# Patient Record
Sex: MALE | Race: WHITE | NOT HISPANIC OR LATINO | Employment: FULL TIME | URBAN - METROPOLITAN AREA
[De-identification: names, ages, dates, MRNs, and addresses within clinical notes are randomized per-mention and may not be internally consistent; named-entity substitution may affect disease eponyms.]

---

## 2022-06-08 ENCOUNTER — OFFICE VISIT (OUTPATIENT)
Dept: PHYSICAL THERAPY | Facility: CLINIC | Age: 37
End: 2022-06-08
Payer: COMMERCIAL

## 2022-06-08 DIAGNOSIS — M25.511 ACUTE PAIN OF RIGHT SHOULDER: Primary | ICD-10-CM

## 2022-06-08 DIAGNOSIS — Z98.890 STATUS POST SHOULDER SURGERY: ICD-10-CM

## 2022-06-08 PROCEDURE — 97112 NEUROMUSCULAR REEDUCATION: CPT | Performed by: PHYSICAL THERAPIST

## 2022-06-08 PROCEDURE — 97161 PT EVAL LOW COMPLEX 20 MIN: CPT | Performed by: PHYSICAL THERAPIST
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
## 2022-06-14 ENCOUNTER — OFFICE VISIT (OUTPATIENT)
Dept: PHYSICAL THERAPY | Facility: CLINIC | Age: 37
End: 2022-06-14
Payer: COMMERCIAL

## 2022-06-14 DIAGNOSIS — Z98.890 STATUS POST SHOULDER SURGERY: ICD-10-CM

## 2022-06-14 DIAGNOSIS — M25.511 ACUTE PAIN OF RIGHT SHOULDER: Primary | ICD-10-CM

## 2022-06-14 PROCEDURE — 97110 THERAPEUTIC EXERCISES: CPT

## 2022-06-14 PROCEDURE — 97140 MANUAL THERAPY 1/> REGIONS: CPT

## 2022-06-14 PROCEDURE — 97112 NEUROMUSCULAR REEDUCATION: CPT

## 2022-06-14 NOTE — PROGRESS NOTES
Daily Note     Today's date: 2022  Patient name: Tigre Kiran  : 1985  MRN: [id number]  Referring provider: Cain Schultz MD  Dx:   Encounter Diagnosis     ICD-10-CM    1  Acute pain of right shoulder  M25 511    2  Status post shoulder surgery  Z98 890                   Subjective: Teresa Burdick reports R shoulder soreness upon arrival, describes sx's as constant and "dull ache"  Pt notes he may be overdoing activity at home, which could be contributing to sorness  Objective: See treatment diary below      Assessment: Zan tolerated PT treatment well  Passive shoulder stretching performed, pt presenting with end range tightness throughout all planes  STM performed along R bicep and deltoid, TP and increased tone present throughout  Initiated periscapular strengthening, pt performed well and w/o symptom provocation  Modified exercises for appropriate diagnoses, discussed with Jenita Opitz, DPT  Time spent educating pt on activity modification at home to reduce R shoulder pain  Pt would benefit from continued PT to further address impairments and maximize functional level  Plan: Continue per plan of care        Precautions: DOS 22      Manuals            R shoulder PROM  JW           STM   JW                                     Neuro Re-Ed             Scap Retract 5"x10            Sh Isometrics 5"x10 ea            TB Row  20/ 3x10           TB Ext  15/ 3x10            TB B ER  O 2x10           TB horz abd   O 2x10            Shoulder add  8/ 3x10            Ther Ex             UBE             UT Stretch 30"x3 :30x3            Lat stretch   :30x3                                                                Ther Activity             Wall Slides                           Gait Training                                       Modalities             CP prn

## 2022-06-22 ENCOUNTER — OFFICE VISIT (OUTPATIENT)
Dept: PHYSICAL THERAPY | Facility: CLINIC | Age: 37
End: 2022-06-22
Payer: COMMERCIAL

## 2022-06-22 DIAGNOSIS — M25.511 ACUTE PAIN OF RIGHT SHOULDER: Primary | ICD-10-CM

## 2022-06-22 DIAGNOSIS — Z98.890 STATUS POST SHOULDER SURGERY: ICD-10-CM

## 2022-06-22 PROCEDURE — 97112 NEUROMUSCULAR REEDUCATION: CPT | Performed by: PHYSICAL THERAPIST

## 2022-06-22 PROCEDURE — 97110 THERAPEUTIC EXERCISES: CPT | Performed by: PHYSICAL THERAPIST

## 2022-06-22 PROCEDURE — 97140 MANUAL THERAPY 1/> REGIONS: CPT | Performed by: PHYSICAL THERAPIST

## 2022-06-22 NOTE — PROGRESS NOTES
Daily Note     Today's date: 2022  Patient name: June Mcfarlane  : 1985  MRN: [id number]  Referring provider: Danie Danielson MD  Dx:   Encounter Diagnosis     ICD-10-CM    1  Acute pain of right shoulder  M25 511    2  Status post shoulder surgery  Z98 890           Subjective: Pt reported that he has been "getting better as it goes "     Objective: See treatment diary below    Assessment: Tolerated treatment well  Patient demonstrated fatigue post treatment, exhibited good technique with therapeutic exercises and would benefit from continued PT  Plan: Continue per plan of care        Precautions: DOS 22      Manuals       R shoulder PROM  JW JZ      STM   JW JZ      AP and inferior mobs    JZ                        Neuro Re-Ed        TB Row  20/ 3x10       TB Ext  15/ 3x10        TB B ER  O 2x10 O 3x10      TB horz abd   O 2x10  O 2x10       Shoulder add  8/ 3x10  MRE 2x2 slow steady reps       U/l LPD kneeling   15/ 2x10                        Ther Ex        UBE         UT Stretch 30"x3 :30x3        Lat stretch   :30x3  :30x2       Horiz add stretch   :30x2      Horiz abd   MRE 2x2 slow steady      Wand scaption AAROM    20x       Wand flexion AAROM   20x       Wand abduction AAROM         Seated thoracic ext 1/2 foam b/l sh flexion   15x slow steady reps                Ther Activity         Wall Slides                                              Modalities         CP prn

## 2022-06-23 ENCOUNTER — OFFICE VISIT (OUTPATIENT)
Dept: PHYSICAL THERAPY | Facility: CLINIC | Age: 37
End: 2022-06-23
Payer: COMMERCIAL

## 2022-06-23 DIAGNOSIS — M25.511 ACUTE PAIN OF RIGHT SHOULDER: Primary | ICD-10-CM

## 2022-06-23 DIAGNOSIS — Z98.890 STATUS POST SHOULDER SURGERY: ICD-10-CM

## 2022-06-23 PROCEDURE — 97110 THERAPEUTIC EXERCISES: CPT

## 2022-06-23 PROCEDURE — 97140 MANUAL THERAPY 1/> REGIONS: CPT

## 2022-06-23 PROCEDURE — 97112 NEUROMUSCULAR REEDUCATION: CPT

## 2022-06-23 NOTE — PROGRESS NOTES
Daily Note     Today's date: 2022  Patient name: Mohamud Daly  : 1985  MRN: [id number]  Referring provider: Soledad Steinberg MD  Dx:   Encounter Diagnosis     ICD-10-CM    1  Acute pain of right shoulder  M25 511    2  Status post shoulder surgery  Z98 890           Subjective: Marlene Ayala reports R shoulder had increased soreness following last PT session  He states soreness is along top of shoulder and into bicep and deltoid musculature  Objective: See treatment diary below    Assessment: Zan tolerated PT treatment well  Initiated session with UBE warm up  Passive shoulder stretching performed, end range tightness present with abduction and rotation range  STM performed along bicep and anterior deltoid, increased tone present throughout musculature  He fatigues fairly quickly with periscapular strengthening  Pt would benefit from continued PT to further address impairments and maximize functional level  Plan: Continue per plan of care        Precautions: DOS 22      Manuals      R shoulder PROM  JW JZ JW     STM   JW JZ JW     AP and inferior mobs    JZ                        Neuro Re-Ed       TB Row  20/ 3x10       TB Ext  15/ 3x10        TB B ER  O 2x10 O 3x10 O 3x10     TB horz abd   O 2x10  O 2x10  O 3x10      Shoulder add  8/ 3x10  MRE 2x2 slow steady reps       U/l LPD kneeling   15/ 2x10                        Ther Ex       UT Stretch 30"x3 :30x3   :30x3 + SCM :30x3      Lat stretch   :30x3  :30x2  :30x3     Horiz add stretch   :30x2 :30x3      Horiz abd   MRE 2x2 slow steady      Wand scaption AAROM    20x  20x      Wand flexion AAROM   20x  20x     Wand abduction AAROM    20x      Seated thoracic ext 1/2 foam b/l sh flexion   15x slow steady reps  15x slow steady reps               Ther Activity        UBE    3'/3'                                         Modalities         CP prn

## 2022-06-27 ENCOUNTER — APPOINTMENT (OUTPATIENT)
Dept: PHYSICAL THERAPY | Facility: CLINIC | Age: 37
End: 2022-06-27
Payer: COMMERCIAL

## 2022-06-29 ENCOUNTER — OFFICE VISIT (OUTPATIENT)
Dept: PHYSICAL THERAPY | Facility: CLINIC | Age: 37
End: 2022-06-29
Payer: COMMERCIAL

## 2022-06-29 DIAGNOSIS — Z98.890 STATUS POST SHOULDER SURGERY: ICD-10-CM

## 2022-06-29 DIAGNOSIS — M25.511 ACUTE PAIN OF RIGHT SHOULDER: Primary | ICD-10-CM

## 2022-06-29 PROCEDURE — 97112 NEUROMUSCULAR REEDUCATION: CPT

## 2022-06-29 PROCEDURE — 97140 MANUAL THERAPY 1/> REGIONS: CPT

## 2022-06-29 PROCEDURE — 97110 THERAPEUTIC EXERCISES: CPT

## 2022-06-29 NOTE — PROGRESS NOTES
Daily Note     Today's date: 2022  Patient name: Jj Hankins  : 1985  MRN: [id number]  Referring provider: Linda Pacheco MD  Dx:   Encounter Diagnosis     ICD-10-CM    1  Acute pain of right shoulder  M25 511    2  Status post shoulder surgery  Z98 890           Subjective: Maryam Snyder reports R shoulder is improving but he is still having a significant amount of soreness along top of shoulder and into bicep and deltoid  Pt is unsure if he is ready to return to work d/t laborious activities he has to perform  Objective: See treatment diary below    Assessment: Zan tolerated PT treatment well  Shoulder ROM is progressing appropriately at this time, discomfort present nearing end range flexion and abduction  Continues to present with significant tissue restriction throughout deltoid and bicep  He is challenged with current periscapular strengthening, tactile cues required to correct compensatory movements  Added shoulder AROM w/o symptom provocation  Pt requires frequent cueing to remain within pain free range  Pt would benefit from continued PT to further address impairments and maximize functional level  Plan: Continue per plan of care        Precautions: DOS 22      Manuals     R shoulder PROM  JW JZ JW JW    STM   JW JZ JW JW    AP and inferior mobs    JZ                        Neuro Re-Ed      TB Row  20/ 3x10   20/ 3x10    TB Ext  15/ 3x10    17/ 3x10     TB B ER  O 2x10 O 3x10 O 3x10 OTB 3x10 belt for elbows    TB horz abd   O 2x10  O 2x10  O 3x10  OTB 3x10     Shoulder add  8/ 3x10  MRE 2x2 slow steady reps       U/l LPD kneeling   15/ 2x10                        Ther Ex      UT Stretch 30"x3 :30x3   :30x3 + SCM :30x3      Lat stretch   :30x3  :30x2  :30x3 :30x3    Horiz add stretch   :30x2 :30x3  :30x3     Horiz abd   MRE 2x2 slow steady      Wand scaption AAROM    20x  20x      Wand flexion AAROM   20x  20x     Wand abduction AAROM    20x  20x     Seated thoracic ext 1/2 foam b/l sh flexion   15x slow steady reps  15x slow steady reps  15x slow steady reps    Shoulder flexion AROM      3x10     Ther Activity   6/22 6/23 6/29    UBE    3'/3' 3'/3'                                        Modalities         CP prn

## 2022-07-06 ENCOUNTER — OFFICE VISIT (OUTPATIENT)
Dept: PHYSICAL THERAPY | Facility: CLINIC | Age: 37
End: 2022-07-06
Payer: COMMERCIAL

## 2022-07-06 DIAGNOSIS — M25.511 ACUTE PAIN OF RIGHT SHOULDER: Primary | ICD-10-CM

## 2022-07-06 PROCEDURE — 97140 MANUAL THERAPY 1/> REGIONS: CPT

## 2022-07-06 PROCEDURE — 97110 THERAPEUTIC EXERCISES: CPT

## 2022-07-06 NOTE — PROGRESS NOTES
Daily Note     Today's date: 2022  Patient name: Diana Goldman  : 1985  MRN: [id number]  Referring provider: Jennifer Haro MD  Dx:   Encounter Diagnosis     ICD-10-CM    1  Acute pain of right shoulder  M25 511           Subjective: Zhanna Jackson reports R shoulder felt good following last PT session but he has been experiencing increased pain the last 4-5 days  Pt states he is unsure what caused increase in shoulder soreness, has been trying to be mindful of activity with RUE at home but may be over doing it  Objective: See treatment diary below    Assessment: Zan tolerated PT treatment fair  Unable to perform retro on UBE d/t shoulder aggravation  MH applied to R shoulder prior to manuals  STM performed along R bicep, deltoid, UT, and SCM  Pt presenting with increased tone and TTP throughout musculature  Shoulder ROM is progressing well, most limitation with abduction currently  Held on exercises d/t high pain irritability today  Time spent educating pt on activity modification at home and CP use to reduce inflammation and pain levels  Pt would benefit from continued PT to further address impairments and maximize functional level  Plan: Continue per plan of care        Precautions: DOS 22      Manuals  7/6   R shoulder PROM  JW JZ JW JW JW   STM   JW JZ JW JW JW   AP and inferior mobs    JZ                        Neuro Re-Ed   7/6   TB Row  20/ 3x10   20/ 3x10    TB Ext  15/ 3x10    17/ 3x10     TB B ER  O 2x10 O 3x10 O 3x10 OTB 3x10 belt for elbows    TB horz abd   O 2x10  O 2x10  O 3x10  OTB 3x10     Shoulder add  8/ 3x10  MRE 2x2 slow steady reps       U/l LPD kneeling   15/ 2x10                        Ther Ex   7/6   UT Stretch 30"x3 :30x3   :30x3 + SCM :30x3      Lat stretch   :30x3  :30x2  :30x3 :30x3 :30x3   Horiz add stretch   :30x2 :30x3  :30x3  :30x3   Horiz abd   MRE 2x2 slow steady      Wand scaption AAROM    20x  20x Wand flexion AAROM   20x  20x     Wand abduction AAROM    20x  20x     Seated thoracic ext 1/2 foam b/l sh flexion   15x slow steady reps  15x slow steady reps  15x slow steady reps    Shoulder flexion AROM      3x10     Ther Activity   6/22 6/23 6/29 7/6   UBE    3'/3' 3'/3' 3'                                       Modalities         CP prn

## 2022-07-08 ENCOUNTER — OFFICE VISIT (OUTPATIENT)
Dept: PHYSICAL THERAPY | Facility: CLINIC | Age: 37
End: 2022-07-08
Payer: COMMERCIAL

## 2022-07-08 DIAGNOSIS — Z98.890 STATUS POST SHOULDER SURGERY: ICD-10-CM

## 2022-07-08 DIAGNOSIS — M25.511 ACUTE PAIN OF RIGHT SHOULDER: Primary | ICD-10-CM

## 2022-07-08 PROCEDURE — 97140 MANUAL THERAPY 1/> REGIONS: CPT | Performed by: PHYSICAL THERAPIST

## 2022-07-08 PROCEDURE — 97110 THERAPEUTIC EXERCISES: CPT | Performed by: PHYSICAL THERAPIST

## 2022-07-08 PROCEDURE — 97112 NEUROMUSCULAR REEDUCATION: CPT | Performed by: PHYSICAL THERAPIST

## 2022-07-08 NOTE — PROGRESS NOTES
Daily Note     Today's date: 2022  Patient name: Diana Goldman  : 1985  MRN: [id number]  Referring provider: Jennifer Haro MD  Dx:   Encounter Diagnosis     ICD-10-CM    1  Acute pain of right shoulder  M25 511    2  Status post shoulder surgery  Z98 890           Subjective: Pt reported that he had the follow up appointemnt with his surgeon  Noted that he was content with his current level of progress  He is still being held out from work till September  Objective: See treatment diary below    Assessment: Tolerated treatment well  Patient demonstrated fatigue post treatment, exhibited good technique with therapeutic exercises and would benefit from continued PT  Plan: Continue per plan of care        Precautions: DOS 22      Manuals    R shoulder PROM JZ   JW JW JW   STM  JZ   JW JW JW   AP and inferior mobs  JZ                          Neuro Re-Ed    TB Row 25/ 3x10    20/ 3x10    TB Ext 10/ 3x10    17/ 3x10     TB B ER O 3x10   O 3x10 OTB 3x10 belt for elbows    TB horz abd  O 3x10    O 3x10  OTB 3x10     Shoulder add         U/l LPD kneeling         High row 10/ 2x10                 Ther Ex    UT Stretch    :30x3 + SCM :30x3      Lat stretch  :30x3   :30x3 :30x3 :30x3   Horiz add stretch :30x3   :30x3  :30x3  :30x3            Wand scaption AAROM     20x      Wand flexion AAROM    20x     Wand abduction AAROM    20x  20x     Seated thoracic ext 1/2 foam b/l sh flexion 15x slow steady reps   15x slow steady reps  15x slow steady reps         3x10                       Ther Activity    UBE    3'/3' 3'/3' 3'                                       Modalities         CP prn

## 2022-07-11 ENCOUNTER — OFFICE VISIT (OUTPATIENT)
Dept: PHYSICAL THERAPY | Facility: CLINIC | Age: 37
End: 2022-07-11
Payer: COMMERCIAL

## 2022-07-11 DIAGNOSIS — M25.511 ACUTE PAIN OF RIGHT SHOULDER: Primary | ICD-10-CM

## 2022-07-11 DIAGNOSIS — Z98.890 STATUS POST SHOULDER SURGERY: ICD-10-CM

## 2022-07-11 PROCEDURE — 97110 THERAPEUTIC EXERCISES: CPT | Performed by: PHYSICAL THERAPIST

## 2022-07-11 PROCEDURE — 97140 MANUAL THERAPY 1/> REGIONS: CPT | Performed by: PHYSICAL THERAPIST

## 2022-07-11 PROCEDURE — 97112 NEUROMUSCULAR REEDUCATION: CPT | Performed by: PHYSICAL THERAPIST

## 2022-07-11 NOTE — PROGRESS NOTES
Daily Note     Today's date: 2022  Patient name: Emelina Wells  : 1985  MRN: [id number]  Referring provider: Stone Olson MD  Dx:   Encounter Diagnosis     ICD-10-CM    1  Acute pain of right shoulder  M25 511    2  Status post shoulder surgery  Z98 890           Subjective: Pt noted that "I am feeling much better today than last time "     Objective: See treatment diary below    Assessment: Tolerated treatment well  Patient demonstrated fatigue post treatment, exhibited good technique with therapeutic exercises and would benefit from continued PT  Plan: Continue per plan of care        Precautions: DOS 22      Manuals    R shoulder PROM JZ JZ    JW   STM  JZ JZ    JW   AP and inferior mobs  JZ JZ                         Neuro Re-Ed    TB Row 25/ 3x10        TB Ext 10/ 3x10        TB B ER O 3x10 3/ 3x20       TB horz abd  O 3x10  4/ 3x15       Shoulder add  10/ 3x10       U/l LPD kneeling  23/ 3x10       High row 10/ 2x10                          Ther Ex    UT Stretch         Lat stretch  :30x3 :30x3    :30x3   Horiz add stretch :30x3 :30x3    :30x3   Horiz abd stretch  :20x3       90 90 stretch wall  :30x3        Seated thoracic ext 1/2 foam b/l sh flexion 15x slow steady reps 10x slow steady reps       UBE  2'/2'                          Ther Activity          3'                                       Modalities        CP prn

## 2022-07-13 ENCOUNTER — OFFICE VISIT (OUTPATIENT)
Dept: PHYSICAL THERAPY | Facility: CLINIC | Age: 37
End: 2022-07-13
Payer: COMMERCIAL

## 2022-07-13 DIAGNOSIS — Z98.890 STATUS POST SHOULDER SURGERY: ICD-10-CM

## 2022-07-13 DIAGNOSIS — M25.511 ACUTE PAIN OF RIGHT SHOULDER: Primary | ICD-10-CM

## 2022-07-13 PROCEDURE — 97112 NEUROMUSCULAR REEDUCATION: CPT

## 2022-07-13 PROCEDURE — 97140 MANUAL THERAPY 1/> REGIONS: CPT

## 2022-07-13 PROCEDURE — 97110 THERAPEUTIC EXERCISES: CPT

## 2022-07-13 NOTE — PROGRESS NOTES
Daily Note     Today's date: 2022  Patient name: Feliciano Acosta  : 1985  MRN: [id number]  Referring provider: Maryellen Enciso MD  Dx:   Encounter Diagnosis     ICD-10-CM    1  Acute pain of right shoulder  M25 511    2  Status post shoulder surgery  Z98 890           Subjective: Leticia Mcnamara reports soreness in R shoulder is gradually improving since decreasing activity at home and focusing on gentle stretching  Objective: See treatment diary below    Assessment: Leticia Mcnamara displays good tolerance to current POC  Shoulder ROM is progressing well at this time, some limitation remaining with abduction and IR  STM performed to UT, bicep, and deltoid  Tissue restriction and TTP present throughout musculature  He performed periscapular strengthening w/o symptom provocation  Pt requiring cues for proper muscle recruitment throughout exercises  Pt would benefit from continued PT to further address impairments and maximize functional level  Plan: Continue per plan of care        Precautions: DOS 22      Manuals    R shoulder PROM JZ JZ JZ   JW   STM  JZ JZ JZ   JW   AP and inferior mobs  JZ JZ                         Neuro Re-Ed    TB Row 25/ 3x10  30/ 3x10       TB Ext 10/ 3x10  15/ 3x10       TB B ER O 3x10 3/ 3x20 4/ 3x10       TB horz abd  O 3x10  4/ 3x15 GTB 3x10       Shoulder add  10/ 3x10 10/ 3x10       U/l LPD kneeling  23/ 3x10 23/ 3x10      High row 10/ 2x10                          Ther Ex    UT Stretch         Lat stretch  :30x3 :30x3 :30x3   :30x3   Horiz add stretch :30x3 :30x3 :30x3   :30x3   Horiz abd stretch  :20x3 :30x3      90 90 stretch wall  :30x3  :30x3       Seated thoracic ext 1/2 foam b/l sh flexion 15x slow steady reps 10x slow steady reps 2x10 slow steady       UBE  2'/2'  3'/3'                        Ther Activity          3'                                       Modalities       CP prn

## 2022-07-18 ENCOUNTER — OFFICE VISIT (OUTPATIENT)
Dept: PHYSICAL THERAPY | Facility: CLINIC | Age: 37
End: 2022-07-18
Payer: COMMERCIAL

## 2022-07-18 DIAGNOSIS — Z98.890 STATUS POST SHOULDER SURGERY: ICD-10-CM

## 2022-07-18 DIAGNOSIS — M25.511 ACUTE PAIN OF RIGHT SHOULDER: Primary | ICD-10-CM

## 2022-07-18 PROCEDURE — 97112 NEUROMUSCULAR REEDUCATION: CPT

## 2022-07-18 PROCEDURE — 97110 THERAPEUTIC EXERCISES: CPT

## 2022-07-18 NOTE — PROGRESS NOTES
Daily Note     Today's date: 2022  Patient name: Blanchard Closs  : 1985  MRN: [id number]  Referring provider: Leann Snow MD  Dx:   Encounter Diagnosis     ICD-10-CM    1  Acute pain of right shoulder  M25 511    2  Status post shoulder surgery  Z98 890           Subjective: Rosanne Villalobos reports continued improvement in R shoulder soreness  Pt states he rode his quad this weekend w/o challenge or pain occurring  Objective: See treatment diary below    Assessment: Zan tolerated PT treatment well  Passive shoulder stretching performed, pt with tightness in abduction and IR/ER range  Continued with periscapular strengthening  Added Ear[place] punch and IR w/o provocation  Pt does require cueing for proper technique throughout exercises  Fatigue evident post session  Pt would benefit from continued PT to further address impairments and maximize functional level  Plan: Continue per plan of care        Precautions: DOS 22      Manuals    R shoulder PROM JZ JZ JZ JW  JW   STM  JZ JZ JZ   JW   AP and inferior mobs  JZ JZ                         Neuro Re-Ed    TB Row 25/ 3x10  30/ 3x10  37/ 3x10      TB Ext 10/ 3x10  15/ 3x10  17/ 3x10      TB B ER O 3x10 3/ 3x20 4/ 3x10  4/ 3x10      TB horz abd  O 3x10  4/ 3x15 GTB 3x10       Shoulder add  10/ 3x10 10/ 3x10  10/ 3x10      U/l LPD kneeling  23/ 3x10 23/ 3x10 23/ 3x10     High row 10/ 2x10   15/ 3x10      Punch     10/ 3x10      [place] IR     5/ 3x10      Ther Ex   7   UT Stretch         Lat stretch  :30x3 :30x3 :30x3 :30x3  :30x3   Horiz add stretch :30x3 :30x3 :30x3 :30x3  :30x3   Horiz abd stretch  :20x3 :30x3 :30x3      90 90 stretch wall  :30x3  :30x3  :30x3      Seated thoracic ext 1/2 foam b/l sh flexion 15x slow steady reps 10x slow steady reps 2x10 slow steady       UBE  2'/2'  3'/3' 3'/'3                       Ther Activity          3' Modalities 7/8 7/11 7/13 7/18     CP prn

## 2022-07-20 ENCOUNTER — APPOINTMENT (OUTPATIENT)
Dept: PHYSICAL THERAPY | Facility: CLINIC | Age: 37
End: 2022-07-20
Payer: COMMERCIAL

## 2022-07-25 ENCOUNTER — OFFICE VISIT (OUTPATIENT)
Dept: PHYSICAL THERAPY | Facility: CLINIC | Age: 37
End: 2022-07-25
Payer: COMMERCIAL

## 2022-07-25 DIAGNOSIS — Z98.890 STATUS POST SHOULDER SURGERY: ICD-10-CM

## 2022-07-25 DIAGNOSIS — M25.511 ACUTE PAIN OF RIGHT SHOULDER: Primary | ICD-10-CM

## 2022-07-25 PROCEDURE — 97110 THERAPEUTIC EXERCISES: CPT

## 2022-07-25 PROCEDURE — 97112 NEUROMUSCULAR REEDUCATION: CPT

## 2022-07-25 PROCEDURE — 97140 MANUAL THERAPY 1/> REGIONS: CPT

## 2022-07-25 NOTE — PROGRESS NOTES
Daily Note     Today's date: 2022  Patient name: Francisca Parsons  : 1985  MRN: [id number]  Referring provider: Matthew Baxter MD  Dx:   Encounter Diagnosis     ICD-10-CM    1  Acute pain of right shoulder  M25 511    2  Status post shoulder surgery  Z98 890           Subjective: Hannah Mishra reports R shoulder strength and ROM is improving each week  Pt states he has been gradually trying to do more at home, but not pushing it too much  Objective: See treatment diary below    Assessment: Hannah Mishra is progressing well with current POC  Passive shoulder stretching performed, end range tightness remaining with flexion at this time  STM performed to R UT, LS, and RTC musculature, increased tone present throughout  MRE performed throughout all planes, strength deficits evident with horizontal abd/add and flexion  Added shoulder AROM with DB to diary, pt performed w/o provocation  Fatigue evident post session  Pt would benefit from continued PT to further address impairments and maximize functional level  Plan: Continue per plan of care        Precautions: DOS 22      Manuals     R shoulder PROM JZ JZ JZ JW JW    STM  JZ JZ JZ  Prone JW    AP and inferior mobs  JZ JZ                         Neuro Re-Ed     TB Row 25/ 3x10  30/ 3x10  37/ 3x10  37/ 3x10     TB Ext 10/ 3x10  15/ 3x10  17/ 3x10  18/ 3x10     TB B ER O 3x10 3/ 3x20 4/ 3x10  4/ 3x10      TB horz abd  O 3x10  4/ 3x15 GTB 3x10   MRE 3x5     Shoulder add  10/ 3x10 10/ 3x10  10/ 3x10  MRE 3x5     U/l LPD kneeling  23/ 3x10 23/ 3x10 23/ 3x10     High row 10/ 2x10   15/ 3x10      Punch     10/ 3x10      [place] IR     5/ 3x10      AROM flexion          TB wall clock      GTB x3 ea              Ther Ex     UT Stretch         Lat stretch  :30x3 :30x3 :30x3 :30x3     Horiz add stretch :30x3 :30x3 :30x3 :30x3 :30x3     Horiz abd stretch  :20x3 :30x3 :30x3  :30x3     90 90 stretch wall :30x3  :30x3  :30x3  :30x3     Seated thoracic ext 1/2 foam b/l sh flexion 15x slow steady reps 10x slow steady reps 2x10 slow steady       UBE  2'/2'  3'/3' 3'/'3 3'/3'    AROM flexion     3# 2x10     AROM abduction      3# 2x10     Shoulder press      3# 2x10              Ther Activity 7/8 7/11 7/13 7/18 7/25                                                 Modalities 7/8 7/11 7/13 7/18 7/25    CP prn

## 2022-07-27 ENCOUNTER — OFFICE VISIT (OUTPATIENT)
Dept: PHYSICAL THERAPY | Facility: CLINIC | Age: 37
End: 2022-07-27
Payer: COMMERCIAL

## 2022-07-27 DIAGNOSIS — M25.511 ACUTE PAIN OF RIGHT SHOULDER: Primary | ICD-10-CM

## 2022-07-27 DIAGNOSIS — Z98.890 STATUS POST SHOULDER SURGERY: ICD-10-CM

## 2022-07-27 PROCEDURE — 97110 THERAPEUTIC EXERCISES: CPT

## 2022-07-27 PROCEDURE — 97112 NEUROMUSCULAR REEDUCATION: CPT

## 2022-07-27 PROCEDURE — 97140 MANUAL THERAPY 1/> REGIONS: CPT

## 2022-07-27 NOTE — PROGRESS NOTES
Daily Note     Today's date: 2022  Patient name: Sadia Oropeza  : 1985  MRN: [id number]  Referring provider: Guillermo Restrepo MD  Dx:   Encounter Diagnosis     ICD-10-CM    1  Acute pain of right shoulder  M25 511    2  Status post shoulder surgery  Z98 890           Subjective: Angel Jones reports following last PT session he had increased R shoulder soreness, symptoms still present today  Pt states majority of soreness present along tip of shoulder and deltoid/bicep region  Objective: See treatment diary below    Assessment: Zan tolerated PT treatment well  Modified session performed today d/t subjective reports of soreness  Passive shoulder stretching performed, pt currently has limitation with flexion and abduction secondary to pain  STM performed along R UT, deltoid, and bicep musculature  Pt presents with TTP and tissue restriction throughout  Attempted periscapular strengthening, pt with some irritation throughout therefor held on additional repetitions  Pt would benefit from continued PT to further address impairments and maximize functional level  Plan: Continue per plan of care        Precautions: DOS 22      Manuals    R shoulder PROM JZ JZ JZ JW JW JW   STM  JZ JZ JZ  Prone JW JW   AP and inferior mobs  JZ JZ                         Neuro Re-Ed    TB Row 25/ 3x10  30/ 3x10  37/ 3x10  37/ 3x10  37/ 3x10    TB Ext 10/ 3x10  15/ 3x10  17/ 3x10  18/ 3x10     TB B ER O 3x10 3/ 3x20 4/ 3x10  4/ 3x10      TB horz abd  O 3x10  4/ 3x15 GTB 3x10   MRE 3x5     Shoulder add  10/ 3x10 10/ 3x10  10/ 3x10  MRE 3x5     U/l LPD kneeling  23/ 3x10 23/ 3x10 23/ 3x10     High row 10/ 2x10   15/ 3x10      Punch     10/ 3x10      [place] IR     5/ 3x10      AROM flexion          TB wall clock      GTB x3 ea              Ther Ex    UT Stretch         Lat stretch  :30x3 :30x3 :30x3 :30x3     Horiz add stretch :30x3 :30x3 :30x3 :30x3 :30x3  :30x3   Horiz abd stretch  :20x3 :30x3 :30x3  :30x3  :30x3   90 90 stretch wall  :30x3  :30x3  :30x3  :30x3  :30x3    Seated thoracic ext 1/2 foam b/l sh flexion 15x slow steady reps 10x slow steady reps 2x10 slow steady       UBE  2'/2'  3'/3' 3'/'3 3'/3' 3'   AROM flexion     3# 2x10     AROM abduction      3# 2x10     Shoulder press      3# 2x10              Ther Activity 7/8 7/11 7/13 7/18 7/25 7/27                                                Modalities 7/8 7/11 7/13 7/18 7/25 7/27   CP prn

## 2022-08-01 ENCOUNTER — APPOINTMENT (OUTPATIENT)
Dept: PHYSICAL THERAPY | Facility: CLINIC | Age: 37
End: 2022-08-01
Payer: COMMERCIAL

## 2022-08-03 ENCOUNTER — OFFICE VISIT (OUTPATIENT)
Dept: PHYSICAL THERAPY | Facility: CLINIC | Age: 37
End: 2022-08-03
Payer: COMMERCIAL

## 2022-08-03 DIAGNOSIS — Z98.890 STATUS POST SHOULDER SURGERY: ICD-10-CM

## 2022-08-03 DIAGNOSIS — M25.511 ACUTE PAIN OF RIGHT SHOULDER: Primary | ICD-10-CM

## 2022-08-03 PROCEDURE — 97110 THERAPEUTIC EXERCISES: CPT

## 2022-08-03 PROCEDURE — 97112 NEUROMUSCULAR REEDUCATION: CPT

## 2022-08-03 NOTE — PROGRESS NOTES
Daily Note     Today's date: 8/3/2022  Patient name: Elvin Juan  : 1985  MRN: [id number]  Referring provider: Ej Chan MD  Dx:   Encounter Diagnosis     ICD-10-CM    1  Acute pain of right shoulder  M25 511    2  Status post shoulder surgery  Z98 890           Subjective: Kathryn Hu reports R shoulder soreness has improved some since last PT session  Pt states he would like to start lifting at the gym, but knows he needs to be cautious  Objective: See treatment diary below    Assessment: Kathryn Hu displayed improved tolerance to to PT today  Passive shoulder ROM performed  Pt presenting with tightness nearing end range abduction, ROM has improved greatly throughout all planes  Pt was able to complete DB shoulder AROM w/o provocation today  Pt displays good form throughout therapeutic exercises, no UT compensation displayed  Added u/l TB horizontal abduction to further address strength deficits  Pt ws given updated HEP and TB for home use  Pt would benefit from continued PT to further address impairments and maximize functional level  Plan: Continue per plan of care        Precautions: DOS 22      Manuals 8/3  7/13 7/18 7/25 7/27   R shoulder PROM JW  JZ JW JW JW   STM    JZ  Prone JW JW   AP and inferior mobs                            Neuro Re-Ed 8/3  7/13 7/18 7/25 7/27   TB Row   30/ 3x10  37/ 3x10  37/ 3x10  37/ 3x10    TB Ext   15/ 3x10  17/ 3x10  18/ 3x10     TB B ER 6/ 3x10   4/ 3x10  4/ 3x10      TB horz abd  GTB 3x10   GTB 3x10   MRE 3x5     Shoulder add   10/ 3x10  10/ 3x10  MRE 3x5     U/l LPD kneeling   23/ 3x10 23/ 3x10     High row    15/ 3x10      Punch  10/ 3x10    10/ 3x10      [place] IR     5/ 3x10      AROM flexion          TB wall clock      GTB x3 ea              Ther Ex 8/3  7/13 7/18 7/25 7/27   UT Stretch         Lat stretch    :30x3 :30x3     Horiz add stretch :30x3   :30x3 :30x3 :30x3  :30x3   Horiz abd stretch :30x3  :30x3 :30x3  :30x3  :30x3   90 90 stretch wall :30x38  :30x3  :30x3  :30x3  :30x3    Seated thoracic ext 1/2 foam b/l sh flexion   2x10 slow steady       UBE 3'/'3'  3'/3' 3'/'3 3'/3' 3'   AROM flexion 2# 3x10     3# 2x10     AROM abduction  2# 3x10     3# 2x10     Shoulder press  2# 3x10     3# 2x10              Ther Activity 8/3  7/13 7/18 7/25 7/27                                                Modalities 8/3  7/13 7/18 7/25 7/27   CP prn

## 2022-08-08 ENCOUNTER — OFFICE VISIT (OUTPATIENT)
Dept: PHYSICAL THERAPY | Facility: CLINIC | Age: 37
End: 2022-08-08
Payer: COMMERCIAL

## 2022-08-08 DIAGNOSIS — Z98.890 STATUS POST SHOULDER SURGERY: ICD-10-CM

## 2022-08-08 DIAGNOSIS — M25.511 ACUTE PAIN OF RIGHT SHOULDER: Primary | ICD-10-CM

## 2022-08-08 PROCEDURE — 97140 MANUAL THERAPY 1/> REGIONS: CPT

## 2022-08-08 PROCEDURE — 97110 THERAPEUTIC EXERCISES: CPT

## 2022-08-08 PROCEDURE — 97112 NEUROMUSCULAR REEDUCATION: CPT

## 2022-08-08 NOTE — PROGRESS NOTES
Daily Note     Today's date: 2022  Patient name: Elvin Juan  : 1985  MRN: [id number]  Referring provider: Ej Chan MD  Dx:   Encounter Diagnosis     ICD-10-CM    1  Acute pain of right shoulder  M25 511    2  Status post shoulder surgery  Z98 890           Subjective: Theotis Fritz reports increased R shoulder soreness upon arrival  Pt states he completed TB HEP over the weekend and noticed soreness following a few hours after exercise  Pt states he did double up the TB, which may have been too much resistance  Objective: See treatment diary below    Assessment: Zan tolerated PT treatment well  Passive shoulder stretching performed, ROM is WFL with end range tightness present in abduction and flexion  STM performed R RTC musculature and deltoid, pt presenting with TTP throughout  AROM performed w/o DB resistance to avoid further irritability  Pt educated in activity and HEP modification to avoid further R shoulder aggravation, pt with good understanding  Resume full program NV pending pt's irritability  Pt would benefit from continued PT to further address impairments and maximize functional level  Plan: Continue per plan of care        Precautions: DOS 22      Manuals 8/3 8/8  7/18 7/25 7/27   R shoulder PROM JW Alexa Prior JW   STM   JW   Prone JW JW   AP and inferior mobs                            Neuro Re-Ed 8/3 8/8  7/18 7/25 7/27   TB Row    37/ 3x10  37/ 3x10  37/ 3x10    TB Ext    17/ 3x10  18/ 3x10     TB B ER 6/ 3x10  B/l GTB 3x10   4/ 3x10      TB horz abd  GTB 3x10  GTB 3x10    MRE 3x5     Shoulder add    10/ 3x10  MRE 3x5     U/l LPD kneeling    23/ 3x10     High row    15/ 3x10      Punch  10/ 3x10    10/ 3x10      [place] IR     5/ 3x10      AROM flexion          TB wall clock      GTB x3 ea              Ther Ex 8/3 8/8  7/18 7/25 7/27   UT Stretch         Lat stretch     :30x3     Horiz add stretch :30x3  :30x3  :30x3 :30x3  :30x3   Horiz abd stretch :30x3 :30x3   :30x3 :30x3  :30x3   90 90 stretch wall :30x3 :30x3 ea  :30x3  :30x3  :30x3    Seated thoracic ext 1/2 foam b/l sh flexion         UBE 3'/'3' 6' fwd   3'/'3 3'/3' 3'   AROM flexion 2# 3x10  3x10    3# 2x10     AROM abduction  2# 3x10  3x10    3# 2x10     Shoulder press  2# 3x10  3x10    3# 2x10              Ther Activity 8/3 8/8  7/18 7/25 7/27                                                Modalities 8/3 8/8  7/18 7/25 7/27   CP prn

## 2022-08-10 ENCOUNTER — OFFICE VISIT (OUTPATIENT)
Dept: PHYSICAL THERAPY | Facility: CLINIC | Age: 37
End: 2022-08-10
Payer: COMMERCIAL

## 2022-08-10 DIAGNOSIS — M25.511 ACUTE PAIN OF RIGHT SHOULDER: Primary | ICD-10-CM

## 2022-08-10 DIAGNOSIS — Z98.890 STATUS POST SHOULDER SURGERY: ICD-10-CM

## 2022-08-10 PROCEDURE — 97110 THERAPEUTIC EXERCISES: CPT | Performed by: PHYSICAL THERAPIST

## 2022-08-10 PROCEDURE — 97140 MANUAL THERAPY 1/> REGIONS: CPT | Performed by: PHYSICAL THERAPIST

## 2022-08-10 PROCEDURE — 97112 NEUROMUSCULAR REEDUCATION: CPT | Performed by: PHYSICAL THERAPIST

## 2022-08-10 NOTE — PROGRESS NOTES
Daily Note     Today's date: 8/10/2022  Patient name: Donita Marino  : 1985  MRN: [id number]  Referring provider: Mar Mccarthy MD  Dx:   Encounter Diagnosis     ICD-10-CM    1  Acute pain of right shoulder  M25 511    2  Status post shoulder surgery  Z98 890           Subjective: Pt noted that he has been taking a couple days of rest from his HEP because of his increase in pain following the last PT visit  Objective: See treatment diary below    Assessment: Tolerated treatment well  Patient demonstrated fatigue post treatment, exhibited good technique with therapeutic exercises and would benefit from continued PT  Plan: Continue per plan of care        Precautions: DOS 22      Manuals 8/3 8/8 8/10   7/27   R shoulder PROM JW JW JZ   JW   STM   JW    JW   AP and inferior mobs    JZ                        Neuro Re-Ed 8/3 8/8 8/10   7/27   TB Row      37/ 3x10    TB Ext         TB B ER 6/ 3x10  B/l GTB 3x10  B 3x30       TB horz abd  GTB 3x10  GTB 3x10        Shoulder add         U/l LPD kneeling         High row         Punch  10/ 3x10         [place] IR          Prone T     3x10      Prone Y    3x10                Ther Ex 8/3 8/8 8/10   7/27   UT Stretch   :20x3       Lat stretch    :30x3       Lat stretch w/ weight shift   :30x2       Horiz add stretch :30x3  :30x3    :30x3   Horiz abd stretch :30x3 :30x3     :30x3   90 90 stretch wall :30x3 :30x3 ea :30x2    :30x3             UBE 3'/'3' 6' fwd     3'   AROM flexion 2# 3x10  3x10        AROM abduction  2# 3x10  3x10        Shoulder press  2# 3x10  3x10        Sh 90 90   MRE 3x5 slow       Self mob inf glide   20x                         Ther Activity 8/3 8/8 8/10   7/27                                                Modalities 8/3 8/8 8/10   7/27   CP prn

## 2022-08-15 ENCOUNTER — APPOINTMENT (OUTPATIENT)
Dept: PHYSICAL THERAPY | Facility: CLINIC | Age: 37
End: 2022-08-15
Payer: COMMERCIAL

## 2022-08-17 ENCOUNTER — OFFICE VISIT (OUTPATIENT)
Dept: PHYSICAL THERAPY | Facility: CLINIC | Age: 37
End: 2022-08-17
Payer: COMMERCIAL

## 2022-08-17 DIAGNOSIS — M25.511 ACUTE PAIN OF RIGHT SHOULDER: Primary | ICD-10-CM

## 2022-08-17 DIAGNOSIS — Z98.890 STATUS POST SHOULDER SURGERY: ICD-10-CM

## 2022-08-17 PROCEDURE — 97110 THERAPEUTIC EXERCISES: CPT

## 2022-08-17 PROCEDURE — 97112 NEUROMUSCULAR REEDUCATION: CPT

## 2022-08-17 PROCEDURE — 97140 MANUAL THERAPY 1/> REGIONS: CPT

## 2022-08-17 NOTE — PROGRESS NOTES
Daily Note     Today's date: 2022  Patient name: Lang Negro  : 1985  MRN: [id number]  Referring provider: Oleksandr Murphy MD  Dx:   Encounter Diagnosis     ICD-10-CM    1  Acute pain of right shoulder  M25 511    2  Status post shoulder surgery  Z98 890           Subjective: Dioni Phan reports he continues to experience pain along top of R shoulder  Pt states he is feeling stronger, but pain is limiting activities at this time  Objective: See treatment diary below    Assessment: Zan tolerated PT treatment well  Pt able to achieve greater range with DB AROM flexion and abduction  Prone scapular strengthening performed, pt fatiguing fairly quickly on RUE  Tactle cues required for periscapular engagement throughout exercises  Passive shoulder stretching performed, ROM is WFL at this time  Palpable tone present throughout R UT, (-)IASTM performed to address  Assess response NV  Pt would benefit from continued PT to further address impairments and maximize functional level  Plan: Continue per plan of care        Precautions: DOS 22      Manuals 8/3 8/8 8/10 8/17  7/27   R shoulder PROM JW JW JZ JW  JW   STM   JW  (-)IASTM JW  JW   AP and inferior mobs    JZ                        Neuro Re-Ed 8/3 8/8 8/10 8/17  7/27   TB Row      37/ 3x10    TB Ext         TB B ER 6/ 3x10  B/l GTB 3x10  B 3x30  B 3x10      TB horz abd  GTB 3x10  GTB 3x10   B 3x10      Shoulder add         U/l LPD kneeling         High row         Punch  10/ 3x10         [place] IR          Prone T     3x10 3x10     Prone Y    3x10  3x10              Ther Ex 8/3 8/8 8/10 8/17  7/27   UT Stretch   :20x3  :30x2      Lat stretch    :30x3  :30x3 ea     Lat stretch w/ weight shift   :30x2       Horiz add stretch :30x3  :30x3  :30x3 ea   :30x3   Horiz abd stretch :30x3 :30x3   :30x3 ea   :30x3   90 90 stretch wall :30x3 :30x3 ea :30x2    :30x3             UBE 3'/'3' 6' fwd     3'   AROM flexion 2# 3x10  3x10   2# 3x10      AROM abduction  2# 3x10  3x10   2# 3x10      Shoulder press  2# 3x10  3x10   2# 3x10      Sh 90 90   MRE 3x5 slow       Self mob inf glide   20x                         Ther Activity 8/3 8/8 8/10 8/17  7/27                                                Modalities 8/3 8/8 8/10 8/17  7/27   CP prn

## 2022-08-22 ENCOUNTER — EVALUATION (OUTPATIENT)
Dept: PHYSICAL THERAPY | Facility: CLINIC | Age: 37
End: 2022-08-22
Payer: COMMERCIAL

## 2022-08-22 DIAGNOSIS — M25.511 ACUTE PAIN OF RIGHT SHOULDER: Primary | ICD-10-CM

## 2022-08-22 DIAGNOSIS — Z98.890 STATUS POST SHOULDER SURGERY: ICD-10-CM

## 2022-08-22 PROCEDURE — 97112 NEUROMUSCULAR REEDUCATION: CPT | Performed by: PHYSICAL THERAPIST

## 2022-08-22 PROCEDURE — 97140 MANUAL THERAPY 1/> REGIONS: CPT | Performed by: PHYSICAL THERAPIST

## 2022-08-22 PROCEDURE — 97110 THERAPEUTIC EXERCISES: CPT | Performed by: PHYSICAL THERAPIST
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
## 2022-08-24 ENCOUNTER — OFFICE VISIT (OUTPATIENT)
Dept: PHYSICAL THERAPY | Facility: CLINIC | Age: 37
End: 2022-08-24
Payer: COMMERCIAL

## 2022-08-24 DIAGNOSIS — M25.511 ACUTE PAIN OF RIGHT SHOULDER: Primary | ICD-10-CM

## 2022-08-24 DIAGNOSIS — Z98.890 STATUS POST SHOULDER SURGERY: ICD-10-CM

## 2022-08-24 PROCEDURE — 97110 THERAPEUTIC EXERCISES: CPT | Performed by: PHYSICAL THERAPIST

## 2022-08-24 PROCEDURE — 97140 MANUAL THERAPY 1/> REGIONS: CPT | Performed by: PHYSICAL THERAPIST

## 2022-08-24 PROCEDURE — 97112 NEUROMUSCULAR REEDUCATION: CPT | Performed by: PHYSICAL THERAPIST

## 2022-08-24 NOTE — PROGRESS NOTES
Daily Note     Today's date: 2022  Patient name: Bobby Herrera  : 1985  MRN: [id number]  Referring provider: Liam Weber MD  Dx:   Encounter Diagnosis     ICD-10-CM    1  Acute pain of right shoulder  M25 511    2  Status post shoulder surgery  Z98 890           Subjective: Pt stated that he would like to start working on overhead strengthening due to his return to work demands  Objective: See treatment diary below    Assessment: Tolerated treatment well  Patient demonstrated fatigue post treatment, exhibited good technique with therapeutic exercises and would benefit from continued PT  Plan: Continue per plan of care        Precautions: DOS 22      Manuals 8/3 8/8 8/10 8/17 8/24    R shoulder PROM JW JW JZ JW JZ    STM   JW  (-)IASTM JW JZ    AP and inferior mobs    JZ  GALILEAZ                      Neuro Re-Ed 8/3 8/8 8/10 8/17 8/24    Sh flex 180-90      MRE 3x10    Sh ext in scapular plan 180-90      MRE 3x10    TB B ER 6/ 3x10  B/l GTB 3x10  B 3x30  B 3x10      TB horz abd  GTB 3x10  GTB 3x10   B 3x10  MRE 3x10    Horiz add     MRE 3x10     High row         Punch  10/ 3x10                  Prone T     3x10 3x10     Prone Y    3x10  3x10              Ther Ex 8/3 8/8 8/10 8/17 8/24    Lat stretch    :30x3  :30x3 ea     Horiz add stretch :30x3  :30x3  :30x3 ea      Horiz abd stretch :30x3 :30x3   :30x3 ea      90 90 stretch wall :30x3 :30x3 ea :30x2       UBE 3'/'3' 6' fwd    2'/2'    AROM flexion 2# 3x10  3x10   2# 3x10      AROM abduction  2# 3x10  3x10   2# 3x10      Shoulder press  2# 3x10  3x10   2# 3x10      Sh 90 90 ER/IR   MRE 3x5 slow   MRE 3x10 ea    Self mob inf glide   20x       supine Overhead tricep ext      MRE 3x10             Ther Activity 8/3 8/8 8/10 8/17 8/24                                                 Modalities 8/3 8/8 8/10 8/17 8/24    CP prn

## 2022-08-29 ENCOUNTER — APPOINTMENT (OUTPATIENT)
Dept: PHYSICAL THERAPY | Facility: CLINIC | Age: 37
End: 2022-08-29
Payer: COMMERCIAL

## 2022-08-31 ENCOUNTER — OFFICE VISIT (OUTPATIENT)
Dept: PHYSICAL THERAPY | Facility: CLINIC | Age: 37
End: 2022-08-31
Payer: COMMERCIAL

## 2022-08-31 DIAGNOSIS — M25.511 ACUTE PAIN OF RIGHT SHOULDER: Primary | ICD-10-CM

## 2022-08-31 DIAGNOSIS — Z98.890 STATUS POST SHOULDER SURGERY: ICD-10-CM

## 2022-08-31 PROCEDURE — 97140 MANUAL THERAPY 1/> REGIONS: CPT | Performed by: PHYSICAL THERAPIST

## 2022-08-31 PROCEDURE — 97112 NEUROMUSCULAR REEDUCATION: CPT | Performed by: PHYSICAL THERAPIST

## 2022-08-31 PROCEDURE — 97110 THERAPEUTIC EXERCISES: CPT | Performed by: PHYSICAL THERAPIST

## 2022-08-31 NOTE — PROGRESS NOTES
Daily Note     Today's date: 2022  Patient name: Felicitas Veloz  : 1985  MRN: [id number]  Referring provider: Fabián Arrington MD  Dx:   Encounter Diagnosis     ICD-10-CM    1  Acute pain of right shoulder  M25 511    2  Status post shoulder surgery  Z98 890           Subjective: Pt reported that he was cleared to return to work full duty on 22  Noted that he also received a shoulder injection on Monday  Objective: See treatment diary below    Assessment: Tolerated treatment well  Patient demonstrated fatigue post treatment, exhibited good technique with therapeutic exercises and would benefit from continued PT  Plan: Continue per plan of care        Precautions: DOS 22      Manuals 8/3 8/8 8/10 8/17 8/24 8/31   R shoulder PROM JW JW JZ JW ISHAN CALEROZ   STM   JW  (-)IASTM JW ISHAN OLMSTEAD   AP and inferior mobs    JMARYANN CALEROZ                     Neuro Re-Ed 8/3 8/8 8/10 8/17 8/24 8/31   Sh flex 180-90      MRE 3x10 MRE 3x20   Sh ext in scapular plan 180-90      MRE 3x10 MRE 3x20   TB B ER 6/ 3x10  B/l GTB 3x10  B 3x30  B 3x10   MRE 3x20   TB horz abd  GTB 3x10  GTB 3x10   B 3x10  MRE 3x10 MRE 3x20   Horiz add     MRE 3x10  MRE 3x20   High row         Punch  10/ 3x10                  Prone T     3x10 3x10     Prone Y    3x10  3x10              Ther Ex 8/3 8/8 8/10 8/17 8/24 8/31   Lat stretch    :30x3  :30x3 ea     Horiz add stretch :30x3  :30x3  :30x3 ea      Horiz abd stretch :30x3 :30x3   :30x3 ea      90 90 stretch wall :30x3 :30x3 ea :30x2       UBE 3'/'3' 6' fwd    2'/2' 2'/2'   AROM flexion 2# 3x10  3x10   2# 3x10      AROM abduction  2# 3x10  3x10   2# 3x10      Shoulder press  2# 3x10  3x10   2# 3x10   MRE 3x20   Sh 90 90 ER/IR   MRE 3x5 slow   MRE 3x10 ea MRE 3x20 ea   Self mob inf glide   20x       supine Overhead tricep ext      MRE 3x10 MRE 3x20            Ther Activity 8/3 8/8 8/10 8/17 8/24 8/31                                                Modalities 8/3 8/8 8/10 8/17 8/24 8/31   CP prn

## 2022-09-02 ENCOUNTER — OFFICE VISIT (OUTPATIENT)
Dept: PHYSICAL THERAPY | Facility: CLINIC | Age: 37
End: 2022-09-02
Payer: COMMERCIAL

## 2022-09-02 DIAGNOSIS — M25.511 ACUTE PAIN OF RIGHT SHOULDER: Primary | ICD-10-CM

## 2022-09-02 DIAGNOSIS — Z98.890 STATUS POST SHOULDER SURGERY: ICD-10-CM

## 2022-09-02 PROCEDURE — 97110 THERAPEUTIC EXERCISES: CPT

## 2022-09-02 PROCEDURE — 97112 NEUROMUSCULAR REEDUCATION: CPT

## 2022-09-02 PROCEDURE — 97140 MANUAL THERAPY 1/> REGIONS: CPT

## 2022-09-02 NOTE — PROGRESS NOTES
Daily Note     Today's date: 2022  Patient name: Luis Adams  : 1985  MRN: [id number]  Referring provider: Ed Varma MD  Dx:   Encounter Diagnosis     ICD-10-CM    1  Acute pain of right shoulder  M25 511    2  Status post shoulder surgery  Z98 890    Start Time: 0900  Stop Time: 0945  Total time in clinic (min): 45 minutes     Subjective: Patient reports that he had full pain relief from LV  Objective: See treatment diary below    Assessment: Most challenge noted in abduction, and ER at 90/90 so degree adjusted as MRE progressed this session  Improved tolerance with degree adjustments  Moderate soft tissue restriction UT, and Teres minor  Fatigue noted in (R) shoulder post session  Patient would benefit from continued PT  Plan: Continue per plan of care        Precautions: DOS 22      Manuals 9/2 8/8 8/10 8/17 8/24 8/31   R shoulder PROM LQ JW JZ JW JZ JZ   STM  LQ JW  (-)IASTM JW JZ JZ   AP and inferior mobs    JZ  ISHAN JZ                     Neuro Re-Ed 9/2 8/8 8/10 8/17 8/24 8/31   Sh flex 180-90      MRE 3x10 MRE 3x20   Sh ext in scapular plan 180-90  MRE 2x20    MRE 3x10 MRE 3x20   TB B ER MRE 3x20 B/l GTB 3x10  B 3x30  B 3x10   MRE 3x20   TB horz abd  MRE 3x20 GTB 3x10   B 3x10  MRE 3x10 MRE 3x20   Horiz add MRE 3x20    MRE 3x10  MRE 3x20   High row         Punch                   Prone T     3x10 3x10     Prone Y    3x10  3x10              Ther Ex  8/8 8/10 8/17 8/24 8/31   Lat stretch    :30x3  :30x3 ea     Horiz add stretch :30x3 :30x3  :30x3 ea      Horiz abd stretch :30x3  :30x3   :30x3 ea      90 90 stretch wall  :30x3 ea :30x2       UBE 2'/2' 6' fwd    2'/2' 2'/2'   AROM flexion  3x10   2# 3x10      AROM abduction   3x10   2# 3x10      Shoulder press  NV 3x10   2# 3x10   MRE 3x20   Sh 90 90 ER/IR :30x3   MRE 3x5 slow   MRE 3x10 ea MRE 3x20 ea   Self mob inf glide   20x       supine Overhead tricep ext  MRE 3x20    MRE 3x10 MRE 3x20            Ther Activity  8/8 8/10 8/17 8/24 8/31                                                Modalities  8/8 8/10 8/17 8/24 8/31   CP prn

## 2022-09-08 ENCOUNTER — APPOINTMENT (OUTPATIENT)
Dept: PHYSICAL THERAPY | Facility: CLINIC | Age: 37
End: 2022-09-08
Payer: COMMERCIAL

## 2022-09-12 ENCOUNTER — OFFICE VISIT (OUTPATIENT)
Dept: PHYSICAL THERAPY | Facility: CLINIC | Age: 37
End: 2022-09-12
Payer: COMMERCIAL

## 2022-09-12 DIAGNOSIS — Z98.890 STATUS POST SHOULDER SURGERY: ICD-10-CM

## 2022-09-12 DIAGNOSIS — M25.511 ACUTE PAIN OF RIGHT SHOULDER: Primary | ICD-10-CM

## 2022-09-12 PROCEDURE — 97110 THERAPEUTIC EXERCISES: CPT | Performed by: PHYSICAL THERAPIST

## 2022-09-12 PROCEDURE — 97140 MANUAL THERAPY 1/> REGIONS: CPT | Performed by: PHYSICAL THERAPIST

## 2022-09-12 PROCEDURE — 97112 NEUROMUSCULAR REEDUCATION: CPT | Performed by: PHYSICAL THERAPIST

## 2022-09-12 NOTE — PROGRESS NOTES
Daily Note     Today's date: 2022  Patient name: Hedy Sandoval  : 1985  MRN: [id number]  Referring provider: Ignacio Camp MD  Dx:   Encounter Diagnosis     ICD-10-CM    1  Acute pain of right shoulder  M25 511    2  Status post shoulder surgery  Z98 890           Subjective: Pt noted that he is still feeling some apprehension and fear about return to work, "however it will be what it will be "     Objective: See treatment diary below    Assessment: Tolerated treatment well  Patient demonstrated fatigue post treatment, exhibited good technique with therapeutic exercises and would benefit from continued PT  Plan: Continue per plan of care        Precautions: DOS 22      Manuals    R shoulder PROM LQ JZ   JMARYANN JZ   STM  LQ JZ   JZ JZ   AP and inferior mobs   JZ   ISHAN JZ                     Neuro Re-Ed    Sh flex 180-90   MRE 3x20   MRE 3x10 MRE 3x20   Sh ext in scapular plan 180-90  MRE 2x20 MRE 2x20   MRE 3x10 MRE 3x20   TB B ER MRE 3x20     MRE 3x20   TB horz abd  MRE 3x20 MRE 3x20   MRE 3x10 MRE 3x20   Horiz add MRE 3x20 7 5/  3x20   MRE 3x10  MRE 3x20   High row         Punch   17/ 3x20 ea       D2 flexion  5/ 3x20 ea       seated T    4/ 3x20 ea       Seated Y   3 5/ 3x20 ea                Ther Ex     Lat stretch          Horiz add stretch :30x3        Horiz abd stretch :30x3         90 90 stretch wall  :30x3       UBE 2'/2' 3'/3'   2'/2' 2'/2'   AROM flexion         AROM abduction          U/l tricep  15/ 3x20        Sh 90 90 ER/IR :30x3     MRE 3x10 ea MRE 3x20 ea   Self mob inf glide         supine Overhead tricep ext  MRE 3x20 Stand 15/ 3x20 ea   MRE 3x10 MRE 3x20   Sh adduction  15/ 3x10        Straight arm to 90 90  4/ 3x20                 Ther Activity                                                  Modalities     CP prn

## 2022-09-14 ENCOUNTER — OFFICE VISIT (OUTPATIENT)
Dept: PHYSICAL THERAPY | Facility: CLINIC | Age: 37
End: 2022-09-14
Payer: COMMERCIAL

## 2022-09-14 DIAGNOSIS — M25.511 ACUTE PAIN OF RIGHT SHOULDER: Primary | ICD-10-CM

## 2022-09-14 DIAGNOSIS — Z98.890 STATUS POST SHOULDER SURGERY: ICD-10-CM

## 2022-09-14 PROCEDURE — 97140 MANUAL THERAPY 1/> REGIONS: CPT

## 2022-09-14 PROCEDURE — 97112 NEUROMUSCULAR REEDUCATION: CPT

## 2022-09-14 PROCEDURE — 97110 THERAPEUTIC EXERCISES: CPT

## 2022-09-14 NOTE — PROGRESS NOTES
Daily Note     Today's date: 2022  Patient name: Leon Kang  : 1985  MRN: [id number]  Referring provider: Magdiel Oconnor MD  Dx:   Encounter Diagnosis     ICD-10-CM    1  Acute pain of right shoulder  M25 511    2  Status post shoulder surgery  Z98 890           Subjective: Omar Pollard reports he returned back to the gym, started with light weight and felt pretty good  Notes occasional superior shoulder discomfort pending over use  Objective: See treatment diary below    Assessment: Zan tolerated PT treatment well  Shoulder ROM is WFL at this time  STM focused along R UT, moderate tissue tone with palpation  Emphasized strengthening above 90 flexion and abduction, pt fatigues fairly quickly  Cues required for slow controlled motion throughout strengthening exercises  Pt would benefit from continued PT to further address impairments and maximize functional level  Plan: Continue per plan of care        Precautions: DOS 22      Manuals    R shoulder PROM LQ JZ JW  JZ JZ   STM  LQ JZ JW  JZ JZ   AP and inferior mobs   JZ   JMARYNAN JZ                     Neuro Re-Ed    Sh flex 180-90   MRE 3x20 7 5 3x10   MRE 3x10 MRE 3x20   Sh ext in scapular plan 180-90  MRE 2x20 MRE 2x20   MRE 3x10 MRE 3x20   TB B ER MRE 3x20     MRE 3x20   TB horz abd  MRE 3x20 MRE 3x20   MRE 3x10 MRE 3x20   Horiz add MRE 3x20 7 5/  3x20 7 5/ 3x10   MRE 3x10  MRE 3x20   High row         Punch   17/ 3x20 ea 17/ 3x10       D2 flexion  5/ 3x20 ea 5/ 3x10       seated T    4/ 3x20 ea 9/ 3x10       Seated Y   3 5/ 3x20 ea 5/ 3x10                Ther Ex     Lat stretch    :30x3       Horiz add stretch :30x3        Horiz abd stretch :30x3         90 90 stretch wall  :30x3 :30x3       UBE 2'/2' 3'/3' 3'/3'  2'/2' 2'/2'   AROM flexion         AROM abduction          U/l tricep  15/ 3x20  15/ 3x10       Sh 90 90 ER/IR :30x3     MRE 3x10 ea MRE 3x20 ea   Self mob inf glide supine Overhead tricep ext  MRE 3x20 Stand 15/ 3x20 ea   MRE 3x10 MRE 3x20   Sh adduction  15/ 3x10        Straight arm to 90 90  4/ 3x20  4/ 3x10                Ther Activity  9/12 9/14 8/24 8/31                                                Modalities  9/12 9/14 8/24 8/31   CP prn

## 2022-09-19 ENCOUNTER — OFFICE VISIT (OUTPATIENT)
Dept: PHYSICAL THERAPY | Facility: CLINIC | Age: 37
End: 2022-09-19
Payer: COMMERCIAL

## 2022-09-19 DIAGNOSIS — M25.511 ACUTE PAIN OF RIGHT SHOULDER: Primary | ICD-10-CM

## 2022-09-19 DIAGNOSIS — Z98.890 STATUS POST SHOULDER SURGERY: ICD-10-CM

## 2022-09-19 PROCEDURE — 97110 THERAPEUTIC EXERCISES: CPT

## 2022-09-19 PROCEDURE — 97140 MANUAL THERAPY 1/> REGIONS: CPT

## 2022-09-19 PROCEDURE — 97112 NEUROMUSCULAR REEDUCATION: CPT

## 2022-09-19 NOTE — PROGRESS NOTES
Daily Note     Today's date: 2022  Patient name: Emanuel Rodrigues  : 1985   MRN: [id number]  Referring provider: Tomasa Sever, MD  Dx:   Encounter Diagnosis     ICD-10-CM    1  Acute pain of right shoulder  M25 511    2  Status post shoulder surgery  Z98 890           Subjective: Chu Lala reports he returned to work on Friday, notes R shoulder discomfort and weakness with work activities and following  Objective: See treatment diary below    Assessment: Zan tolerated PT treatment well  Modified session performed today d/t current levels of irritability  Passive shoulder stretching performed, discomfort reported with initial abduction and flexion range, improving with trial  MRE focusing on strengthening above 90 degrees  Pt would benefit from continued PT to further address impairments and maximize functional level  Plan: Continue per plan of care        Precautions: DOS 22      Manuals    R shoulder PROM LQ JZ JW JW  JZ   STM  LQ JZ JW JW  JZ   AP and inferior mobs   JZ    JZ                     Neuro Re-Ed    Sh flex 180-90   MRE 3x20 7 5 3x10  MRE 3x10  MRE 3x20   Sh ext in scapular plan 180-90  MRE 2x20 MRE 2x20    MRE 3x20   TB B ER MRE 3x20     MRE 3x20   TB horz abd  MRE 3x20 MRE 3x20  MRE 3x10   MRE 3x20   Horiz add MRE 3x20 7 5/  3x20 7 5/ 3x10  MRE 3x10   MRE 3x20   High row         Punch   17/ 3x20 ea 17/ 3x10       D2 flexion  5/ 3x20 ea 5/ 3x10  PTB 3x10      seated T    4/ 3x20 ea 9/ 3x10       Seated Y   3 5/ 3x20 ea 5/ 3x10                Ther Ex     Lat stretch    :30x3  :30x3      Horiz add stretch :30x3        Horiz abd stretch :30x3         90 90 stretch wall  :30x3 :30x3  :30x3      UBE 2'/2' 3'/3' 3'/3' 3'/3'  2'/2'   AROM flexion         AROM abduction          U/l tricep  15/ 3x20  15/ 3x10       Sh 90 90 ER/IR :30x3      MRE 3x20 ea   Self mob inf glide         supine Overhead tricep ext  MRE 3x20 Stand 15/ 3x20 ea    MRE 3x20   Sh adduction  15/ 3x10        Straight arm to 90 90  4/ 3x20  4/ 3x10       Prone Y/T     3x10 ea     Ther Activity  9/12 9/14 9/19 8/31                                                Modalities  9/12 9/14 9/19 8/31   CP prn

## 2022-09-21 ENCOUNTER — APPOINTMENT (OUTPATIENT)
Dept: PHYSICAL THERAPY | Facility: CLINIC | Age: 37
End: 2022-09-21
Payer: COMMERCIAL

## 2022-09-26 ENCOUNTER — APPOINTMENT (OUTPATIENT)
Dept: PHYSICAL THERAPY | Facility: CLINIC | Age: 37
End: 2022-09-26
Payer: COMMERCIAL

## 2022-09-28 ENCOUNTER — OFFICE VISIT (OUTPATIENT)
Dept: PHYSICAL THERAPY | Facility: CLINIC | Age: 37
End: 2022-09-28
Payer: COMMERCIAL

## 2022-09-28 DIAGNOSIS — M25.511 ACUTE PAIN OF RIGHT SHOULDER: Primary | ICD-10-CM

## 2022-09-28 DIAGNOSIS — Z98.890 STATUS POST SHOULDER SURGERY: ICD-10-CM

## 2022-09-28 PROCEDURE — 97110 THERAPEUTIC EXERCISES: CPT

## 2022-09-28 PROCEDURE — 97140 MANUAL THERAPY 1/> REGIONS: CPT

## 2022-09-28 PROCEDURE — 97112 NEUROMUSCULAR REEDUCATION: CPT

## 2022-09-28 NOTE — PROGRESS NOTES
Daily Note     Today's date: 2022  Patient name: Isiah Miles  : 1985   MRN: [id number]  Referring provider: Spike Kelsey MD  Dx:   Encounter Diagnosis     ICD-10-CM    1  Acute pain of right shoulder  M25 511    2  Status post shoulder surgery  Z98 890           Subjective: Arely reports he is back to his regular work schedule, R shoulder soreness present at the end of the day  Notes discomfort primarily located along top of shoulder and into neck  Objective: See treatment diary below    Assessment: Zan tolerated PT treatment well  (-)IASTM performed to R UT, pt with tissue hypomobility throughout  End range tightness present with passive flexion, all other ranges WFL  Continued with focus on strengthening above 90 degrees  R shoulder discomfort reported with closed chain strengthening, held on additional repetitions  Pt would benefit from continued PT to further address impairments and maximize functional level  Plan: Continue per plan of care        Precautions: DOS 22      Manuals     R shoulder PROM LQ JZ JW JW JW    STM  LQ JZ JW JW JW    AP and inferior mobs   JZ                         Neuro Re-Ed     Sh flex 180-90   MRE 3x20 7 5 3x10  MRE 3x10 7 5 3x10     Sh ext in scapular plan 180-90  MRE 2x20 MRE 2x20       TB B ER MRE 3x20        TB horz abd  MRE 3x20 MRE 3x20  MRE 3x10  6/ 3x10     Horiz add MRE 3x20 7 5/  3x20 7 5/ 3x10  MRE 3x10  9/ 3x10    High row         Punch   17/ 3x20 ea 17/ 3x10       D2 flexion  5/ 3x20 ea 5/ 3x10  PTB 3x10  PTB 3x10     seated T    4/ 3x20 ea 9/ 3x10   9/ 3x10     Seated Y   3 5/ 3x20 ea 5/ 3x10   5/ 3x10              Ther Ex      Lat stretch    :30x3  :30x3  :30x3    Horiz add stretch :30x3        Horiz abd stretch :30x3     :30x2     90 90 stretch wall  :30x3 :30x3  :30x3  :30x2    UBE 2'/2' 3'/3' 3'/3' 3'/3' 3'/3'    AROM flexion         AROM abduction          U/l tricep  15/ 3x20  15/ 3x10       Sh 90 90 ER/IR :30x3         Self mob inf glide         supine Overhead tricep ext  MRE 3x20 Stand 15/ 3x20 ea       Sh adduction  15/ 3x10        Straight arm to 90 90  4/ 3x20  4/ 3x10       Prone Y/T     3x10 ea     Ther Activity  9/12 9/14 9/19 9/28    Counter push up      x10 p!                                         Modalities  9/12 9/14 9/19 9/28    CP prn

## 2022-10-03 ENCOUNTER — APPOINTMENT (OUTPATIENT)
Dept: PHYSICAL THERAPY | Facility: CLINIC | Age: 37
End: 2022-10-03

## 2022-10-03 ENCOUNTER — OFFICE VISIT (OUTPATIENT)
Dept: PHYSICAL THERAPY | Facility: CLINIC | Age: 37
End: 2022-10-03
Payer: COMMERCIAL

## 2022-10-03 DIAGNOSIS — Z98.890 STATUS POST SHOULDER SURGERY: ICD-10-CM

## 2022-10-03 DIAGNOSIS — M25.511 ACUTE PAIN OF RIGHT SHOULDER: Primary | ICD-10-CM

## 2022-10-03 PROCEDURE — 97110 THERAPEUTIC EXERCISES: CPT

## 2022-10-03 PROCEDURE — 97112 NEUROMUSCULAR REEDUCATION: CPT

## 2022-10-03 PROCEDURE — 97140 MANUAL THERAPY 1/> REGIONS: CPT

## 2022-10-03 NOTE — PROGRESS NOTES
Daily Note     Today's date: 10/3/2022  Patient name: La Steen  : 1985   MRN: [id number]  Referring provider: Yong Lucero MD  Dx:   Encounter Diagnosis     ICD-10-CM    1  Acute pain of right shoulder  M25 511    2  Status post shoulder surgery  Z98 890           Subjective: Kannan Fierro reports improvement in R UT tightness following last PT session  Pt states R shoulder soreness still present throughout work day, but tolerable  States he has not been stretching at home  Objective: See treatment diary below    Assessment: Zan tolerated PT treatment well  (-)IASTM performed to R UT, continues to present with significant tone throughout  Shoulder ROM WFL throughout all planes  DB added with prone strengthening, pt completed w/o provocation  He demonstrates greater periscapular strength throughout exercises  Updated HEP to reflect current stretching program  Pt would benefit from continued PT to further address impairments and maximize functional level  Plan: Continue per plan of care        Precautions: DOS 22      Manuals  10/3   R shoulder PROM LQ JZ JW JW JW JW   STM  LQ JZ JW JW JW JW   AP and inferior mobs   JZ                         Neuro Re-Ed  10/3   Sh flex 180-90   MRE 3x20 7 5 3x10  MRE 3x10 7 5 3x10     Sh ext in scapular plan 180-90  MRE 2x20 MRE 2x20       TB B ER MRE 3x20        TB horz abd  MRE 3x20 MRE 3x20  MRE 3x10  6/ 3x10     Horiz add MRE 3x20 7 5/  3x20 7 5/ 3x10  MRE 3x10  9/ 3x10    High row         Punch   17/ 3x20 ea 17/ 3x10    17/ 3x20 ea   D2 flexion  5/ 3x20 ea 5/ 3x10  PTB 3x10  PTB 3x10  5/ 3x10    seated T    4/ 3x20 ea 9/ 3x10   9/ 3x10     Seated Y   3 5/ 3x20 ea 5/ 3x10   5/ 3x10              Ther Ex   103   Lat stretch    :30x3  :30x3  :30x3 :30x2    Horiz add stretch :30x3        Horiz abd stretch :30x3     :30x2  :30x2   90 90 stretch wall  :30x3 :30x3  :30x3  :30x2 :30x2   UBE 2'/2' 3'/3' 3'/3' 3'/3' 3'/3' 3'/3'   AROM flexion         AROM abduction          U/l tricep  15/ 3x20  15/ 3x10       Sh 90 90 ER/IR :30x3         Self mob inf glide         supine Overhead tricep ext  MRE 3x20 Stand 15/ 3x20 ea       Sh adduction  15/ 3x10        Straight arm to 90 90  4/ 3x20  4/ 3x10       Prone Y/T     3x10 ea  3# 3x10 Pam Bryce   Ther Activity  9/12 9/14 9/19 9/28 10/3   Counter push up      x10 p!                                         Modalities  9/12 9/14 9/19 9/28 10/3   CP prn

## 2022-10-05 ENCOUNTER — APPOINTMENT (OUTPATIENT)
Dept: PHYSICAL THERAPY | Facility: CLINIC | Age: 37
End: 2022-10-05

## 2022-10-10 ENCOUNTER — OFFICE VISIT (OUTPATIENT)
Dept: PHYSICAL THERAPY | Facility: CLINIC | Age: 37
End: 2022-10-10
Payer: COMMERCIAL

## 2022-10-10 DIAGNOSIS — M25.511 ACUTE PAIN OF RIGHT SHOULDER: Primary | ICD-10-CM

## 2022-10-10 DIAGNOSIS — Z98.890 STATUS POST SHOULDER SURGERY: ICD-10-CM

## 2022-10-10 PROCEDURE — 97110 THERAPEUTIC EXERCISES: CPT | Performed by: PHYSICAL THERAPIST

## 2022-10-10 PROCEDURE — 97140 MANUAL THERAPY 1/> REGIONS: CPT | Performed by: PHYSICAL THERAPIST

## 2022-10-10 NOTE — PROGRESS NOTES
Daily Note     Today's date: 10/10/2022  Patient name: Milly Quarles  : 1985   MRN: [id number]  Referring provider: Princess Williamson MD  Dx:   Encounter Diagnosis     ICD-10-CM    1  Acute pain of right shoulder  [id number]    2  Status post shoulder surgery  [id number]           Subjective: Pt noted that he is feeling more soreness than usual today because he has been working with 6 inch pipe  Noted that he is able to perform all of his work duties per his PLOF  Objective: See treatment diary below    Assessment: Tolerated treatment well  Patient demonstrated fatigue post treatment and exhibited good technique with therapeutic exercises  Pt demonstrated full understanding of his HEP  No longer requires skilled outpatient PT because he is able to perform all of his work duties  Plan: D/C  Precautions: DOS 22      Manuals 10/10   9/19 9/28 10/3   R shoulder PROM JZ   JW JW JW   STM  JZ   JW JW JW   AP and inferior mobs  JZ                          Neuro Re-Ed 10/10   9/19 9/28 10/3   Sh flex 180-90     MRE 3x10 7 5 3x10     Sh ext in scapular plan 180-90          TB B ER         TB horz abd     MRE 3x10  6/ 3x10     Horiz add    MRE 3x10  9/ 3x10    High row         Punch       17/ 3x20 ea   D2 flexion    PTB 3x10  PTB 3x10  5/ 3x10    seated T       9/ 3x10     Seated Y      5/ 3x10              Ther Ex 10/10   9/19 9/28 10/3   Lat stretch  :30x2  ea   :30x3  :30x3 :30x2    Horiz add stretch :30x2         Horiz abd stretch :30x2    :30x2  :30x2   90 90 stretch wall :30x2   :30x3  :30x2 :30x2   UBE 6'   3'/3' 3'/3' 3'/3'   AROM flexion         AROM abduction          U/l tricep         Sh 90 90 ER/IR         Self mob inf glide         supine Overhead tricep ext          Sh adduction         Straight arm to 90 90         Prone Y/T     3x10 ea  3# 3x10 ea I, Y, T                     Ther Activity 10/10   9/19 9/28 10/3   Counter push up      x10 p!

## 2022-10-12 ENCOUNTER — APPOINTMENT (OUTPATIENT)
Dept: PHYSICAL THERAPY | Facility: CLINIC | Age: 37
End: 2022-10-12

## 2022-10-17 ENCOUNTER — APPOINTMENT (OUTPATIENT)
Dept: PHYSICAL THERAPY | Facility: CLINIC | Age: 37
End: 2022-10-17

## 2022-10-19 ENCOUNTER — APPOINTMENT (OUTPATIENT)
Dept: PHYSICAL THERAPY | Facility: CLINIC | Age: 37
End: 2022-10-19

## 2022-10-24 ENCOUNTER — APPOINTMENT (OUTPATIENT)
Dept: PHYSICAL THERAPY | Facility: CLINIC | Age: 37
End: 2022-10-24

## 2022-10-26 ENCOUNTER — APPOINTMENT (OUTPATIENT)
Dept: PHYSICAL THERAPY | Facility: CLINIC | Age: 37
End: 2022-10-26

## 2024-05-16 ENCOUNTER — HOSPITAL ENCOUNTER (OUTPATIENT)
Dept: HOSPITAL 99 - CLAB | Age: 39
End: 2024-05-16
Payer: COMMERCIAL

## 2024-05-16 PROCEDURE — 88302 TISSUE EXAM BY PATHOLOGIST: CPT
